# Patient Record
Sex: MALE | Race: WHITE | NOT HISPANIC OR LATINO | Employment: OTHER | URBAN - NONMETROPOLITAN AREA
[De-identification: names, ages, dates, MRNs, and addresses within clinical notes are randomized per-mention and may not be internally consistent; named-entity substitution may affect disease eponyms.]

---

## 2018-05-09 ENCOUNTER — OFFICE VISIT (OUTPATIENT)
Dept: INTERNAL MEDICINE | Facility: CLINIC | Age: 53
End: 2018-05-09

## 2018-05-09 VITALS
DIASTOLIC BLOOD PRESSURE: 82 MMHG | HEART RATE: 52 BPM | OXYGEN SATURATION: 96 % | TEMPERATURE: 97.8 F | SYSTOLIC BLOOD PRESSURE: 142 MMHG

## 2018-05-09 DIAGNOSIS — K40.90 LEFT INGUINAL HERNIA: Primary | ICD-10-CM

## 2018-05-09 PROCEDURE — 99213 OFFICE O/P EST LOW 20 MIN: CPT | Performed by: INTERNAL MEDICINE

## 2018-05-09 NOTE — PROGRESS NOTES
Chief Complaint   Patient presents with   • Abstract     Pt states he has a hernia, lower left groin region, that is growing.      Subjective   Ray Simmons is a 52 y.o. male.     Pt here today with complaints of possible hernia.   He noted a mass in left groin about a yr ago.   It has been enlarging over the past 2-3 months.  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     Not much pain over the area.          The following portions of the patient's history were reviewed and updated as appropriate: allergies, current medications, past family history, past medical history, past social history, past surgical history and problem list.    Review of Systems   Constitutional: Negative for appetite change and unexpected weight change.   Gastrointestinal: Negative for constipation and diarrhea.        Bulging mass in left groin       Objective   /82   Pulse 52   Temp 97.8 °F (36.6 °C)   SpO2 96%   There is no height or weight on file to calculate BMI.  Physical Exam   Constitutional: He is oriented to person, place, and time. He appears well-developed and well-nourished.   HENT:   Head: Normocephalic and atraumatic.   Eyes: EOM are normal. Pupils are equal, round, and reactive to light.   Pulmonary/Chest: Effort normal.   Abdominal: Soft. Bowel sounds are normal. He exhibits mass.   Obvious mass in medial left groin consistent with inguinal hernia, easily reduced   Neurological: He is alert and oriented to person, place, and time.   Psychiatric: He has a normal mood and affect. His behavior is normal. Judgment and thought content normal.   Nursing note and vitals reviewed.      Assessment/Plan   Ray Simmons is here today and the following problems have been addressed:      Ray was seen today for abstract.    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  He is aware to go to ER if he has severe pain    RTC prn    Please note that portions of this note were completed with a voice recognition program.  Efforts were made to edit dictation, but occasionally words are mistranscribed.

## 2018-05-21 ENCOUNTER — OFFICE VISIT (OUTPATIENT)
Dept: SURGERY | Facility: CLINIC | Age: 53
End: 2018-05-21

## 2018-05-21 VITALS
HEIGHT: 72 IN | HEART RATE: 43 BPM | SYSTOLIC BLOOD PRESSURE: 118 MMHG | TEMPERATURE: 97.9 F | DIASTOLIC BLOOD PRESSURE: 74 MMHG | OXYGEN SATURATION: 99 % | WEIGHT: 207 LBS | BODY MASS INDEX: 28.04 KG/M2

## 2018-05-21 DIAGNOSIS — IMO0002 MASS: Primary | ICD-10-CM

## 2018-05-21 DIAGNOSIS — K40.30 NON-RECURRENT UNILATERAL INGUINAL HERNIA WITH OBSTRUCTION WITHOUT GANGRENE: ICD-10-CM

## 2018-05-21 PROCEDURE — 99244 OFF/OP CNSLTJ NEW/EST MOD 40: CPT | Performed by: SURGERY

## 2018-05-21 NOTE — PROGRESS NOTES
Patient: Ray Simmons    YOB: 1965    Date: 05/21/2018    Primary Care Provider: Marilyn K. Vermeesch, MD    Reason for Consultation: Hernia    Chief Complaint   Patient presents with   • Hernia       Subjective .     History of present illness:  I saw the patient in the office today as a consultation for evaluation and treatment of a bulge in his left groin. He states he has had a bulge intermittently for the past year, worsening with movement. He denies pain. He states he does not recall when the bulge first appeared. 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 He does have crampy type discomfort, worse with lifting, relieved with rest, nonradiating in nature, not associated with other symptoms, present for the past year.    The following portions of the patient's history were reviewed and updated as appropriate: allergies, current medications, past family history, past medical history, past social history, past surgical history and problem list.    Review of Systems   Constitutional: Negative for chills, fever and unexpected weight change.   HENT: Negative for trouble swallowing and voice change.    Eyes: Negative for visual disturbance.   Respiratory: Negative for apnea, cough, chest tightness, shortness of breath and wheezing.    Cardiovascular: Negative for chest pain, palpitations and leg swelling.   Gastrointestinal: Negative for abdominal distention, abdominal pain, anal bleeding, blood in stool, constipation, diarrhea, nausea, rectal pain and vomiting.   Endocrine: Negative for cold intolerance and heat intolerance.   Genitourinary: Negative for difficulty urinating, dysuria, flank pain, scrotal swelling and testicular pain.   Musculoskeletal: Negative for back pain, gait problem and joint swelling.   Skin: Negative for color change, rash and wound.        Mass   Neurological: Negative for dizziness, syncope, speech difficulty, weakness, numbness and headaches.  "  Hematological: Negative for adenopathy. Does not bruise/bleed easily.   Psychiatric/Behavioral: Negative for confusion. The patient is not nervous/anxious.        History:  Past Medical History:   Diagnosis Date   • Elevated cholesterol 2003    REPORTS WAS TOLD \"IT \" BUT HAS NEVER TAKEN MEDICATION FOR THIS   • History of fracture     HISTORY RIGHT FOOT (REQUIRED SURGICAL INTERVENTION), LEFT WRIST, MULTIPLE FINGERS   • Inguinal hernia     LEFT   • PONV (postoperative nausea and vomiting)     AFTER RIGHT FOOT SURGERY   • Positive TB test     REPORTS HIS DAD HAD TB AND THAT HE HAD A + SKIN TEST AFTER EXPOSURE. NO ACTIVE DISEASE BUT DID HAVE TO TAKE MEDICATION.       Past Surgical History:   Procedure Laterality Date   • ADENOIDECTOMY     • FOOT PERCUTANEOUS PINNING Right    • FRACTURE SURGERY Right     FOOT   • HERNIA REPAIR Right     REPORTS AT AGE 1 OR 1    • TONSILLECTOMY     • WISDOM TOOTH EXTRACTION         Family History   Problem Relation Age of Onset   • Tuberculosis Father    • Mental illness Sister    • Cancer Maternal Uncle    • Hypertension Maternal Grandmother    • Heart attack Maternal Grandfather    • Hypertension Paternal Grandmother    • Heart attack Paternal Grandfather        Social History   Substance Use Topics   • Smoking status: Former Smoker     Packs/day: 1.00     Years: 7.00     Types: Cigarettes     Quit date: 1988   • Smokeless tobacco: Current User     Types: Chew   • Alcohol use No       Allergies:  No Known Allergies    Medications:  No current facility-administered medications for this visit.   No current outpatient prescriptions on file.    Facility-Administered Medications Ordered in Other Visits:   •  bupivacaine (PF) (MARCAINE) 0.5 % injection  - ADS Override Pull, , , ,   •  bupivacaine-EPINEPHrine PF (MARCAINE w/EPI) 0.5% -[id number] injection  - ADS Override Pull, , , ,   •  ceFAZolin (ANCEF) IVPB (duplex) 2 g, 2 g, Intravenous, Once, Alejandro Underwood MD  •  lactated ringers " "infusion 1,000 mL, 1,000 mL, Intravenous, Continuous, Alejandro Underwood MD, Last Rate: 25 mL/hr at 05/23/18 0730, 1,000 mL at 05/23/18 0730  •  Scopolamine (TRANSDERM-SCOP) 1.5 MG/3DAYS patch 1 patch, 1 patch, Transdermal, Q72H, Ray Tam, CRNA, 1 patch at 05/23/18 0741  •  sodium chloride 0.9 % flush 3 mL, 3 mL, Intravenous, PRN, Alejanrdo Underwood MD    Objective     Vital Signs:   Vitals:    05/21/18 0830   BP: 118/74   Pulse: (!) 43   Temp: 97.9 °F (36.6 °C)   SpO2: 99%   Weight: 93.9 kg (207 lb)   Height: 182.9 cm (72.01\")       Physical Exam:   General Appearance:    Alert, cooperative, in no acute distress   Head:    Normocephalic, without obvious abnormality, atraumatic   Eyes:            Lids and lashes normal, conjunctivae and sclerae normal, no   icterus, no pallor, corneas clear, PERRLA   Ears:    Ears appear intact with no abnormalities noted   Throat:   No oral lesions, no thrush, oral mucosa moist   Neck:   No adenopathy, supple, trachea midline, no thyromegaly, no   carotid bruit, no JVD   Lungs:     Clear to auscultation,respirations regular, even and                  unlabored    Heart:    Regular rhythm and normal rate, normal S1 and S2, no            murmur   Abdomen:     no masses, no organomegaly, soft non-tender, non-distended, no guarding, there is evidence of a reducible left inguinal hernia   Extremities:   Moves all extremities well, no edema, no cyanosis, no             redness   Pulses:   Pulses palpable and equal bilaterally   Skin:   No bleeding, bruising or rash   Lymph nodes:   No palpable adenopathy   Neurologic:   Cranial nerves 2 - 12 grossly intact, sensation intact        Results Review:   I reviewed the patient's new clinical results.  I reviewed the patient's new imaging results and agree with the interpretation.  I reviewed the patient's other test results and agree with the interpretation    Review of Systems was reviewed and confirmed as accurate " today.    Assessment/Plan :    1. Mass    2. Non-recurrent unilateral inguinal hernia with obstruction without gangrene        I had a detailed and extensive discussion with the patient in the office and they understand that they need to undergo hernia repair with mesh.  Full risks and benefits of operative versus nonoperative intervention were discussed with the patient and these included things such as nonresolution of symptoms and possible worsening of symptoms without surgical intervention versus infection, bleeding, possible recurrent hernia, possible postoperative neuralgia from nerve damage or involvement with scar tissue, etc.  The patient understands, agrees, and had no questions for me at the end of the office visit.     I discussed the patients findings and my recommendations with patient.    Electronically signed by Alejandro Underwood MD  05/23/18    Scribed for Alejandro Underwood MD by Araceli Rowley. 5/23/2018  9:19 AM

## 2018-05-22 ENCOUNTER — APPOINTMENT (OUTPATIENT)
Dept: PREADMISSION TESTING | Facility: HOSPITAL | Age: 53
End: 2018-05-22

## 2018-05-22 ENCOUNTER — PREP FOR SURGERY (OUTPATIENT)
Dept: OTHER | Facility: HOSPITAL | Age: 53
End: 2018-05-22

## 2018-05-22 VITALS — HEIGHT: 72 IN | WEIGHT: 201 LBS | BODY MASS INDEX: 27.22 KG/M2

## 2018-05-22 DIAGNOSIS — K40.30 NON-RECURRENT UNILATERAL INGUINAL HERNIA WITH OBSTRUCTION WITHOUT GANGRENE: ICD-10-CM

## 2018-05-22 DIAGNOSIS — K40.30 NON-RECURRENT UNILATERAL INGUINAL HERNIA WITH OBSTRUCTION WITHOUT GANGRENE: Primary | ICD-10-CM

## 2018-05-22 LAB
DEPRECATED RDW RBC AUTO: 39.8 FL (ref 37–54)
ERYTHROCYTE [DISTWIDTH] IN BLOOD BY AUTOMATED COUNT: 12 % (ref 11.5–14.5)
HCT VFR BLD AUTO: 42.7 % (ref 42–52)
HGB BLD-MCNC: 14.9 G/DL (ref 14–18)
MCH RBC QN AUTO: 31.9 PG (ref 27–31)
MCHC RBC AUTO-ENTMCNC: 34.9 G/DL (ref 30–37)
MCV RBC AUTO: 91.4 FL (ref 80–94)
PLATELET # BLD AUTO: 255 10*3/MM3 (ref 130–400)
PMV BLD AUTO: 10.3 FL (ref 6–12)
RBC # BLD AUTO: 4.67 10*6/MM3 (ref 4.7–6.1)
WBC NRBC COR # BLD: 6.99 10*3/MM3 (ref 4.8–10.8)

## 2018-05-22 PROCEDURE — 85027 COMPLETE CBC AUTOMATED: CPT | Performed by: SURGERY

## 2018-05-22 PROCEDURE — 36415 COLL VENOUS BLD VENIPUNCTURE: CPT

## 2018-05-22 PROCEDURE — 87081 CULTURE SCREEN ONLY: CPT | Performed by: SURGERY

## 2018-05-23 ENCOUNTER — HOSPITAL ENCOUNTER (OUTPATIENT)
Facility: HOSPITAL | Age: 53
Setting detail: HOSPITAL OUTPATIENT SURGERY
Discharge: HOME | End: 2018-05-23
Attending: SURGERY | Admitting: SURGERY

## 2018-05-23 ENCOUNTER — ANESTHESIA EVENT (OUTPATIENT)
Dept: PERIOP | Facility: HOSPITAL | Age: 53
End: 2018-05-23

## 2018-05-23 ENCOUNTER — ANESTHESIA (OUTPATIENT)
Dept: PERIOP | Facility: HOSPITAL | Age: 53
End: 2018-05-23

## 2018-05-23 VITALS
SYSTOLIC BLOOD PRESSURE: 121 MMHG | HEART RATE: 50 BPM | OXYGEN SATURATION: 98 % | TEMPERATURE: 97.3 F | DIASTOLIC BLOOD PRESSURE: 75 MMHG | RESPIRATION RATE: 18 BRPM

## 2018-05-23 DIAGNOSIS — K40.30 NON-RECURRENT UNILATERAL INGUINAL HERNIA WITH OBSTRUCTION WITHOUT GANGRENE: ICD-10-CM

## 2018-05-23 PROCEDURE — 25010000002 ONDANSETRON PER 1 MG: Performed by: NURSE ANESTHETIST, CERTIFIED REGISTERED

## 2018-05-23 PROCEDURE — 25010000002 MIDAZOLAM PER 1 MG: Performed by: NURSE ANESTHETIST, CERTIFIED REGISTERED

## 2018-05-23 PROCEDURE — 25010000002 DEXAMETHASONE PER 1 MG: Performed by: NURSE ANESTHETIST, CERTIFIED REGISTERED

## 2018-05-23 PROCEDURE — 49505 PRP I/HERN INIT REDUC >5 YR: CPT | Performed by: SURGERY

## 2018-05-23 PROCEDURE — 25010000002 PROPOFOL 200 MG/20ML EMULSION: Performed by: NURSE ANESTHETIST, CERTIFIED REGISTERED

## 2018-05-23 PROCEDURE — C1781 MESH (IMPLANTABLE): HCPCS | Performed by: SURGERY

## 2018-05-23 PROCEDURE — 25010000002 KETOROLAC TROMETHAMINE PER 15 MG: Performed by: NURSE ANESTHETIST, CERTIFIED REGISTERED

## 2018-05-23 PROCEDURE — 25010000002 FENTANYL CITRATE (PF) 250 MCG/5ML SOLUTION: Performed by: NURSE ANESTHETIST, CERTIFIED REGISTERED

## 2018-05-23 PROCEDURE — 25010000003 CEFAZOLIN PER 500 MG: Performed by: SURGERY

## 2018-05-23 DEVICE — MESH PROLN 3X6: Type: IMPLANTABLE DEVICE | Site: ABDOMEN | Status: FUNCTIONAL

## 2018-05-23 RX ORDER — DEXAMETHASONE SODIUM PHOSPHATE 4 MG/ML
INJECTION, SOLUTION INTRA-ARTICULAR; INTRALESIONAL; INTRAMUSCULAR; INTRAVENOUS; SOFT TISSUE AS NEEDED
Status: DISCONTINUED | OUTPATIENT
Start: 2018-05-23 | End: 2018-05-23 | Stop reason: SURG

## 2018-05-23 RX ORDER — ROCURONIUM BROMIDE 10 MG/ML
INJECTION, SOLUTION INTRAVENOUS AS NEEDED
Status: DISCONTINUED | OUTPATIENT
Start: 2018-05-23 | End: 2018-05-23 | Stop reason: SURG

## 2018-05-23 RX ORDER — FENTANYL CITRATE 50 UG/ML
INJECTION, SOLUTION INTRAMUSCULAR; INTRAVENOUS AS NEEDED
Status: DISCONTINUED | OUTPATIENT
Start: 2018-05-23 | End: 2018-05-23 | Stop reason: SURG

## 2018-05-23 RX ORDER — PROPOFOL 10 MG/ML
INJECTION, EMULSION INTRAVENOUS AS NEEDED
Status: DISCONTINUED | OUTPATIENT
Start: 2018-05-23 | End: 2018-05-23 | Stop reason: SURG

## 2018-05-23 RX ORDER — ONDANSETRON 4 MG/1
4 TABLET, FILM COATED ORAL ONCE AS NEEDED
Status: DISCONTINUED | OUTPATIENT
Start: 2018-05-23 | End: 2018-05-23 | Stop reason: HOSPADM

## 2018-05-23 RX ORDER — HYDROCODONE BITARTRATE AND ACETAMINOPHEN 7.5; 325 MG/1; MG/1
1 TABLET ORAL ONCE AS NEEDED
Status: DISCONTINUED | OUTPATIENT
Start: 2018-05-23 | End: 2018-05-23 | Stop reason: HOSPADM

## 2018-05-23 RX ORDER — HYDROCODONE BITARTRATE AND ACETAMINOPHEN 7.5; 325 MG/1; MG/1
1-2 TABLET ORAL EVERY 4 HOURS PRN
Qty: 20 TABLET | Refills: 0 | Status: SHIPPED | OUTPATIENT
Start: 2018-05-23 | End: 2021-04-29

## 2018-05-23 RX ORDER — ACETAMINOPHEN 500 MG
1000 TABLET ORAL ONCE
Status: COMPLETED | OUTPATIENT
Start: 2018-05-23 | End: 2018-05-23

## 2018-05-23 RX ORDER — CELECOXIB 100 MG/1
200 CAPSULE ORAL ONCE
Status: COMPLETED | OUTPATIENT
Start: 2018-05-23 | End: 2018-05-23

## 2018-05-23 RX ORDER — SODIUM CHLORIDE, SODIUM LACTATE, POTASSIUM CHLORIDE, CALCIUM CHLORIDE 600; 310; 30; 20 MG/100ML; MG/100ML; MG/100ML; MG/100ML
1000 INJECTION, SOLUTION INTRAVENOUS CONTINUOUS
Status: DISCONTINUED | OUTPATIENT
Start: 2018-05-23 | End: 2018-05-23 | Stop reason: HOSPADM

## 2018-05-23 RX ORDER — SCOLOPAMINE TRANSDERMAL SYSTEM 1 MG/1
1 PATCH, EXTENDED RELEASE TRANSDERMAL
Status: DISCONTINUED | OUTPATIENT
Start: 2018-05-23 | End: 2018-05-23 | Stop reason: HOSPADM

## 2018-05-23 RX ORDER — BUPIVACAINE HYDROCHLORIDE 5 MG/ML
INJECTION, SOLUTION EPIDURAL; INTRACAUDAL
Status: DISCONTINUED
Start: 2018-05-23 | End: 2018-05-23 | Stop reason: HOSPADM

## 2018-05-23 RX ORDER — ONDANSETRON 2 MG/ML
INJECTION INTRAMUSCULAR; INTRAVENOUS AS NEEDED
Status: DISCONTINUED | OUTPATIENT
Start: 2018-05-23 | End: 2018-05-23 | Stop reason: SURG

## 2018-05-23 RX ORDER — BUPIVACAINE HYDROCHLORIDE AND EPINEPHRINE 5; 5 MG/ML; UG/ML
INJECTION, SOLUTION PERINEURAL AS NEEDED
Status: DISCONTINUED | OUTPATIENT
Start: 2018-05-23 | End: 2018-05-23 | Stop reason: HOSPADM

## 2018-05-23 RX ORDER — GLYCOPYRROLATE 0.2 MG/ML
INJECTION INTRAMUSCULAR; INTRAVENOUS AS NEEDED
Status: DISCONTINUED | OUTPATIENT
Start: 2018-05-23 | End: 2018-05-23 | Stop reason: SURG

## 2018-05-23 RX ORDER — PROMETHAZINE HYDROCHLORIDE 25 MG/ML
12.5 INJECTION, SOLUTION INTRAMUSCULAR; INTRAVENOUS ONCE AS NEEDED
Status: DISCONTINUED | OUTPATIENT
Start: 2018-05-23 | End: 2018-05-23 | Stop reason: HOSPADM

## 2018-05-23 RX ORDER — SODIUM CHLORIDE 0.9 % (FLUSH) 0.9 %
3 SYRINGE (ML) INJECTION AS NEEDED
Status: DISCONTINUED | OUTPATIENT
Start: 2018-05-23 | End: 2018-05-23 | Stop reason: HOSPADM

## 2018-05-23 RX ORDER — MIDAZOLAM HYDROCHLORIDE 1 MG/ML
INJECTION INTRAMUSCULAR; INTRAVENOUS AS NEEDED
Status: DISCONTINUED | OUTPATIENT
Start: 2018-05-23 | End: 2018-05-23 | Stop reason: SURG

## 2018-05-23 RX ORDER — KETOROLAC TROMETHAMINE 30 MG/ML
INJECTION, SOLUTION INTRAMUSCULAR; INTRAVENOUS AS NEEDED
Status: DISCONTINUED | OUTPATIENT
Start: 2018-05-23 | End: 2018-05-23 | Stop reason: SURG

## 2018-05-23 RX ORDER — BUPIVACAINE HYDROCHLORIDE AND EPINEPHRINE 5; 5 MG/ML; UG/ML
INJECTION, SOLUTION EPIDURAL; INTRACAUDAL; PERINEURAL
Status: DISCONTINUED
Start: 2018-05-23 | End: 2018-05-23 | Stop reason: HOSPADM

## 2018-05-23 RX ORDER — IBUPROFEN 600 MG/1
600 TABLET ORAL EVERY 6 HOURS PRN
Status: DISCONTINUED | OUTPATIENT
Start: 2018-05-23 | End: 2018-05-23 | Stop reason: HOSPADM

## 2018-05-23 RX ORDER — ONDANSETRON 2 MG/ML
4 INJECTION INTRAMUSCULAR; INTRAVENOUS ONCE AS NEEDED
Status: DISCONTINUED | OUTPATIENT
Start: 2018-05-23 | End: 2018-05-23 | Stop reason: HOSPADM

## 2018-05-23 RX ADMIN — FENTANYL CITRATE 50 MCG: 50 INJECTION, SOLUTION INTRAMUSCULAR; INTRAVENOUS at 09:43

## 2018-05-23 RX ADMIN — FENTANYL CITRATE 100 MCG: 50 INJECTION, SOLUTION INTRAMUSCULAR; INTRAVENOUS at 09:46

## 2018-05-23 RX ADMIN — ROCURONIUM BROMIDE 5 MG: 10 INJECTION INTRAVENOUS at 09:48

## 2018-05-23 RX ADMIN — FENTANYL CITRATE 100 MCG: 50 INJECTION, SOLUTION INTRAMUSCULAR; INTRAVENOUS at 09:28

## 2018-05-23 RX ADMIN — SCOPALAMINE 1 PATCH: 1 PATCH, EXTENDED RELEASE TRANSDERMAL at 07:41

## 2018-05-23 RX ADMIN — ACETAMINOPHEN 1000 MG: 500 TABLET, FILM COATED ORAL at 07:40

## 2018-05-23 RX ADMIN — PROPOFOL 150 MG: 10 INJECTION, EMULSION INTRAVENOUS at 09:31

## 2018-05-23 RX ADMIN — MIDAZOLAM HYDROCHLORIDE 1 MG: 1 INJECTION, SOLUTION INTRAMUSCULAR; INTRAVENOUS at 09:28

## 2018-05-23 RX ADMIN — CEFAZOLIN SODIUM 2 G: 2 SOLUTION INTRAVENOUS at 09:25

## 2018-05-23 RX ADMIN — DEXAMETHASONE SODIUM PHOSPHATE 8 MG: 4 INJECTION, SOLUTION INTRAMUSCULAR; INTRAVENOUS at 09:34

## 2018-05-23 RX ADMIN — FENTANYL CITRATE 50 MCG: 50 INJECTION, SOLUTION INTRAMUSCULAR; INTRAVENOUS at 10:22

## 2018-05-23 RX ADMIN — ONDANSETRON 4 MG: 2 INJECTION INTRAMUSCULAR; INTRAVENOUS at 09:34

## 2018-05-23 RX ADMIN — LIDOCAINE HYDROCHLORIDE 80 MG: 20 INJECTION, SOLUTION INTRAVENOUS at 09:31

## 2018-05-23 RX ADMIN — FENTANYL CITRATE 50 MCG: 50 INJECTION, SOLUTION INTRAMUSCULAR; INTRAVENOUS at 10:17

## 2018-05-23 RX ADMIN — SODIUM CHLORIDE, POTASSIUM CHLORIDE, SODIUM LACTATE AND CALCIUM CHLORIDE 1000 ML: 600; 310; 30; 20 INJECTION, SOLUTION INTRAVENOUS at 07:30

## 2018-05-23 RX ADMIN — KETOROLAC TROMETHAMINE 30 MG: 30 INJECTION, SOLUTION INTRAMUSCULAR at 10:15

## 2018-05-23 RX ADMIN — GLYCOPYRROLATE 0.2 MG: 0.2 INJECTION, SOLUTION INTRAMUSCULAR; INTRAVENOUS at 09:28

## 2018-05-23 RX ADMIN — CELECOXIB 200 MG: 100 CAPSULE ORAL at 07:40

## 2018-05-23 RX ADMIN — FENTANYL CITRATE 50 MCG: 50 INJECTION, SOLUTION INTRAMUSCULAR; INTRAVENOUS at 10:11

## 2018-05-23 NOTE — OP NOTE
PATIENT:    Ray Simmons    DATE OF SURGERY:   5/23/2018    PHYSICIAN:    Alejandro Underwood MD    REFERRING PHYSICIAN:  Marilyn K. Vermeesch, MD    YOB: 1965    PREOPERATIVE DIAGNOSIS:  Left inguinal hernia    POSTOPERATIVE DIAGNOSIS: Left inguinal hernia containing sigmoid colon    PROCEDURE: Left inguinal herniorraphy via Timur repair    HISTORY:  The patient presents to me for evaluation and treatment of a history significant for a left inguinal hernia.    ANESTHESIA:  General endotracheal.    OPERATIVE PROCEDURE:  The patient was taken to the operating room, placed in the supine position, and given general endotracheal anesthesia per the Anesthesia service.  The patient was prepped and draped in the normal sterile fashion and the patient was given preoperative IV antibiotics.  An appropriate timeout per the nursing staff was performed prior to the incision.  I did meet with the patient preoperatively and marked them accordingly.    A transverse incision was made over the left inguinal canal.  This was sharply dissected down through the subcutaneous fat to the external oblique fibers, which were then divided in their direction.  Blunt dissection was used to dissect the surrounding cord structures from the pubic tubercle.  There was evidence of an direct sac, there was a lipoma of the cord that was sent for specimen. The sigmoid colon was reduced without difficulty via the direct opening.    A 3 x 6 piece of Marlex mesh was then fashioned and placed in the floor of the inguinal canal.  It was sutured in place medially with 0-Prolene suture to the pubic tubercle.  This was continued inferiorly and laterally to the shelving edge of the inguinal ligament and superiorly and laterally to the transversalis fascia.  The ends of the Marlex mesh were divided in order to create a new internal inguinal ring.  This was snugly reapproximated with an 0-Prolene suture.    I felt that I had adequate  repair at this point in time.  Marcaine was then injected in the fascia for postoperative anesthetic.      0-Vicryl suture was used in a running fashion to close the external oblique fibers.  3-0 Vicryl suture was used in a running fashion to close the wound.  A 4-0 Vicryl subcuticular stitch and Steri-Strips were used for skin reapproximation.      The patient was stable at this point in time and subsequently transferred back to the recovery room in stable condition.    Alejandro Underwood MD

## 2018-05-23 NOTE — ANESTHESIA POSTPROCEDURE EVALUATION
Patient: Ray Simmons    Procedure Summary     Date:  05/23/18 Room / Location:  [facility] OR  / [facility] OR    Anesthesia Start:  0925 Anesthesia Stop:      Procedure:  INGUINAL HERNIA REPAIR LEFT WITH MESH (Left Abdomen) Diagnosis:       Non-recurrent unilateral inguinal hernia with obstruction without gangrene      (Non-recurrent unilateral inguinal hernia with obstruction without gangrene [K40.30])    Surgeon:  Alejandro Underwood MD Provider:  Jenaro Ya CRNA    Anesthesia Type:  general ASA Status:  2          Anesthesia Type: general  Last vitals  BP   119/74   Temp   97.4   Pulse   89   Resp   15   SpO2   96     Post Anesthesia Care and Evaluation    Patient location during evaluation: PACU  Patient participation: complete - patient participated  Level of consciousness: awake and alert  Pain score: 3  Pain management: satisfactory to patient  Airway patency: patent  Anesthetic complications: No anesthetic complications  PONV Status: none  Cardiovascular status: acceptable and stable  Respiratory status: acceptable and face mask  Hydration status: acceptable

## 2018-05-23 NOTE — H&P (VIEW-ONLY)
Patient: Ray Simmons    YOB: 1965    Date: 05/21/2018    Primary Care Provider: Marilyn K. Vermeesch, MD    Reason for Consultation: Hernia    Chief Complaint   Patient presents with   • Hernia       Subjective .     History of present illness:  I saw the patient in the office today as a consultation for evaluation and treatment of a bulge in his left groin. He states he has had a bulge intermittently for the past year, worsening with movement. He denies pain. He states he does not recall when the bulge first appeared. 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 He does have crampy type discomfort, worse with lifting, relieved with rest, nonradiating in nature, not associated with other symptoms, present for the past year.    The following portions of the patient's history were reviewed and updated as appropriate: allergies, current medications, past family history, past medical history, past social history, past surgical history and problem list.    Review of Systems   Constitutional: Negative for chills, fever and unexpected weight change.   HENT: Negative for trouble swallowing and voice change.    Eyes: Negative for visual disturbance.   Respiratory: Negative for apnea, cough, chest tightness, shortness of breath and wheezing.    Cardiovascular: Negative for chest pain, palpitations and leg swelling.   Gastrointestinal: Negative for abdominal distention, abdominal pain, anal bleeding, blood in stool, constipation, diarrhea, nausea, rectal pain and vomiting.   Endocrine: Negative for cold intolerance and heat intolerance.   Genitourinary: Negative for difficulty urinating, dysuria, flank pain, scrotal swelling and testicular pain.   Musculoskeletal: Negative for back pain, gait problem and joint swelling.   Skin: Negative for color change, rash and wound.        Mass   Neurological: Negative for dizziness, syncope, speech difficulty, weakness, numbness and headaches.  "  Hematological: Negative for adenopathy. Does not bruise/bleed easily.   Psychiatric/Behavioral: Negative for confusion. The patient is not nervous/anxious.        History:  Past Medical History:   Diagnosis Date   • Elevated cholesterol 2003    REPORTS WAS TOLD \"IT \" BUT HAS NEVER TAKEN MEDICATION FOR THIS   • History of fracture     HISTORY RIGHT FOOT (REQUIRED SURGICAL INTERVENTION), LEFT WRIST, MULTIPLE FINGERS   • Inguinal hernia     LEFT   • PONV (postoperative nausea and vomiting)     AFTER RIGHT FOOT SURGERY   • Positive TB test     REPORTS HIS DAD HAD TB AND THAT HE HAD A + SKIN TEST AFTER EXPOSURE. NO ACTIVE DISEASE BUT DID HAVE TO TAKE MEDICATION.       Past Surgical History:   Procedure Laterality Date   • ADENOIDECTOMY     • FOOT PERCUTANEOUS PINNING Right    • FRACTURE SURGERY Right     FOOT   • HERNIA REPAIR Right     REPORTS AT AGE 1 OR 1    • TONSILLECTOMY     • WISDOM TOOTH EXTRACTION         Family History   Problem Relation Age of Onset   • Tuberculosis Father    • Mental illness Sister    • Cancer Maternal Uncle    • Hypertension Maternal Grandmother    • Heart attack Maternal Grandfather    • Hypertension Paternal Grandmother    • Heart attack Paternal Grandfather        Social History   Substance Use Topics   • Smoking status: Former Smoker     Packs/day: 1.00     Years: 7.00     Types: Cigarettes     Quit date: 1988   • Smokeless tobacco: Current User     Types: Chew   • Alcohol use No       Allergies:  No Known Allergies    Medications:  No current facility-administered medications for this visit.   No current outpatient prescriptions on file.    Facility-Administered Medications Ordered in Other Visits:   •  bupivacaine (PF) (MARCAINE) 0.5 % injection  - ADS Override Pull, , , ,   •  bupivacaine-EPINEPHrine PF (MARCAINE w/EPI) 0.5% -1:[id number] injection  - ADS Override Pull, , , ,   •  ceFAZolin (ANCEF) IVPB (duplex) 2 g, 2 g, Intravenous, Once, Alejandro Underwood MD  •  lactated ringers " "infusion 1,000 mL, 1,000 mL, Intravenous, Continuous, Alejandro Underwood MD, Last Rate: 25 mL/hr at 05/23/18 0730, 1,000 mL at 05/23/18 0730  •  Scopolamine (TRANSDERM-SCOP) 1.5 MG/3DAYS patch 1 patch, 1 patch, Transdermal, Q72H, Ray Tam, CRNA, 1 patch at 05/23/18 0741  •  sodium chloride 0.9 % flush 3 mL, 3 mL, Intravenous, PRN, Alejandro Underwood MD    Objective     Vital Signs:   Vitals:    05/21/18 0830   BP: 118/74   Pulse: (!) 43   Temp: 97.9 °F (36.6 °C)   SpO2: 99%   Weight: 93.9 kg (207 lb)   Height: 182.9 cm (72.01\")       Physical Exam:   General Appearance:    Alert, cooperative, in no acute distress   Head:    Normocephalic, without obvious abnormality, atraumatic   Eyes:            Lids and lashes normal, conjunctivae and sclerae normal, no   icterus, no pallor, corneas clear, PERRLA   Ears:    Ears appear intact with no abnormalities noted   Throat:   No oral lesions, no thrush, oral mucosa moist   Neck:   No adenopathy, supple, trachea midline, no thyromegaly, no   carotid bruit, no JVD   Lungs:     Clear to auscultation,respirations regular, even and                  unlabored    Heart:    Regular rhythm and normal rate, normal S1 and S2, no            murmur   Abdomen:     no masses, no organomegaly, soft non-tender, non-distended, no guarding, there is evidence of a reducible left inguinal hernia   Extremities:   Moves all extremities well, no edema, no cyanosis, no             redness   Pulses:   Pulses palpable and equal bilaterally   Skin:   No bleeding, bruising or rash   Lymph nodes:   No palpable adenopathy   Neurologic:   Cranial nerves 2 - 12 grossly intact, sensation intact        Results Review:   I reviewed the patient's new clinical results.  I reviewed the patient's new imaging results and agree with the interpretation.  I reviewed the patient's other test results and agree with the interpretation    Review of Systems was reviewed and confirmed as accurate " today.    Assessment/Plan :    1. Mass    2. Non-recurrent unilateral inguinal hernia with obstruction without gangrene        I had a detailed and extensive discussion with the patient in the office and they understand that they need to undergo hernia repair with mesh.  Full risks and benefits of operative versus nonoperative intervention were discussed with the patient and these included things such as nonresolution of symptoms and possible worsening of symptoms without surgical intervention versus infection, bleeding, possible recurrent hernia, possible postoperative neuralgia from nerve damage or involvement with scar tissue, etc.  The patient understands, agrees, and had no questions for me at the end of the office visit.     I discussed the patients findings and my recommendations with patient.    Electronically signed by Alejandro Underwood MD  05/23/18    Scribed for Alejandro Underwood MD by Araceli Rowley. 5/23/2018  9:19 AM

## 2018-05-23 NOTE — ANESTHESIA PREPROCEDURE EVALUATION
Anesthesia Evaluation     Patient summary reviewed and Nursing notes reviewed   history of anesthetic complications: PONV  NPO Solid Status: > 8 hours  NPO Liquid Status: > 8 hours           Airway   Mallampati: I  TM distance: >3 FB  Neck ROM: full  Narrow palate  Dental - normal exam   (+) poor dentition    Pulmonary - negative pulmonary ROS and normal exam   Cardiovascular - normal exam    (+) hyperlipidemia,       Neuro/Psych- negative ROS  GI/Hepatic/Renal/Endo - negative ROS     Musculoskeletal (-) negative ROS    Abdominal    Substance History - negative use     OB/GYN negative ob/gyn ROS         Other - negative ROS                       Anesthesia Plan    ASA 2     general     intravenous induction   Anesthetic plan and risks discussed with patient.

## 2018-05-23 NOTE — ANESTHESIA PROCEDURE NOTES
Airway  Urgency: elective    Airway not difficult    General Information and Staff    Patient location during procedure: OR  CRNA: JAMARCUS GUEVARA    Indications and Patient Condition  Indications for airway management: airway protection    Preoxygenated: yes  Mask difficulty assessment: 0 - not attempted    Final Airway Details  Final airway type: supraglottic airway      Successful airway: classic  Size 4    Number of attempts at approach: 1    Additional Comments  Cuff pressure/leak less than [id number]

## 2018-05-25 LAB — MRSA SPEC QL CULT: NORMAL

## 2018-05-26 LAB
LAB AP CASE REPORT: NORMAL
Lab: NORMAL
PATH REPORT.FINAL DX SPEC: NORMAL

## 2018-06-13 ENCOUNTER — OFFICE VISIT (OUTPATIENT)
Dept: SURGERY | Facility: CLINIC | Age: 53
End: 2018-06-13

## 2018-06-13 VITALS
BODY MASS INDEX: 27.23 KG/M2 | OXYGEN SATURATION: 100 % | HEART RATE: 56 BPM | WEIGHT: 201.06 LBS | TEMPERATURE: 98.2 F | HEIGHT: 72 IN

## 2018-06-13 DIAGNOSIS — Z98.890 POST-OPERATIVE STATE: Primary | ICD-10-CM

## 2018-06-13 PROCEDURE — 99024 POSTOP FOLLOW-UP VISIT: CPT | Performed by: SURGERY

## 2018-06-13 NOTE — PROGRESS NOTES
"Patient: Ray Simmons    YOB: 1965    Date: 06/13/2018    Primary Care Provider: Marilyn K. Vermeesch, MD    Reason for Consultation: Follow-up hernia    Chief Complaint   Patient presents with   • Post-op Hernia       History of present illness:  I saw the patient in the office today as a followup from their recent hernia repair.  The pathology showed a lipoma.  They state that they have done well and are having no complaints.    The following portions of the patient's history were reviewed and updated as appropriate: allergies, current medications, past family history, past medical history, past social history, past surgical history and problem list.    Vital Signs:   Vitals:    06/13/18 1245   Pulse: 56   Temp: 98.2 °F (36.8 °C)   TempSrc: Temporal Artery    SpO2: 100%   Weight: 91.2 kg (201 lb 1 oz)   Height: 182.9 cm (72.01\")       Physical Exam:   General Appearance:    Alert, cooperative, in no acute distress   Abdomen:     no masses, no organomegaly, soft non-tender, non-distended, no guarding, wounds are well healed, no evidence of recurrent hernia     Results Review:   I reviewed the patient's new clinical results.  I reviewed the patient's new imaging results and agree with the interpretation.  I reviewed the patient's other test results and agree with the interpretation    Assessment / Plan:    1. Post-operative state        I did discuss the situation with the patient today in the office and they have done well from their recent herniorraphy.  I have released the patient back to normal activity, they understand that they need to be careful about heavy lifting.  I need to see the patient back in the office only if they are having further problems, they know to call me if they are.    Electronically signed by Alejandro Underwood MD  06/26/18      Portions of this note have been scribed for Alejandro Underwood MD by Michelle Milligan. 6/26/2018  4:01 PM              "

## 2021-04-29 ENCOUNTER — OFFICE VISIT (OUTPATIENT)
Dept: INTERNAL MEDICINE | Facility: CLINIC | Age: 56
End: 2021-04-29

## 2021-04-29 VITALS
DIASTOLIC BLOOD PRESSURE: 68 MMHG | OXYGEN SATURATION: 98 % | HEIGHT: 69 IN | SYSTOLIC BLOOD PRESSURE: 132 MMHG | WEIGHT: 198 LBS | BODY MASS INDEX: 29.33 KG/M2 | TEMPERATURE: 97 F | HEART RATE: 52 BPM

## 2021-04-29 DIAGNOSIS — K40.90 LEFT INGUINAL HERNIA: Primary | ICD-10-CM

## 2021-04-29 PROBLEM — K40.30 NON-RECURRENT UNILATERAL INGUINAL HERNIA WITH OBSTRUCTION WITHOUT GANGRENE: Status: RESOLVED | Noted: 2018-05-22 | Resolved: 2021-04-29

## 2021-04-29 PROCEDURE — 99213 OFFICE O/P EST LOW 20 MIN: CPT | Performed by: INTERNAL MEDICINE

## 2021-04-29 NOTE — PROGRESS NOTES
"Chief Complaint   Patient presents with   • Abstract     Pt states his had pressure in abdomen and some swelling.      Subjective   Ray Simmons is a 55 y.o. male.     Here today with complaints of abdominal pain.   He is having LLQ pain for about 3 weeks.    No fevers or chills, he does not feel sick.   No pain with lifting, but there is increased pressure and bulge in area with lifting.   No changes in bowel habits and is urinating regularly.   No changes in appetite.   The pain is worse if he tries to dig, shoveling, etc.           The following portions of the patient's history were reviewed and updated as appropriate: allergies, current medications, past family history, past medical history, past social history, past surgical history and problem list.    Review of Systems   Constitutional: Negative for appetite change.   Gastrointestinal: Positive for abdominal pain. Negative for constipation, diarrhea, nausea and vomiting.        Bulge and increased pressure in left inguinal area       Objective   /68   Pulse 52   Temp 97 °F (36.1 °C)   Ht 175.3 cm (69\")   Wt 89.8 kg (198 lb)   SpO2 98%   BMI 29.24 kg/m²   Body mass index is 29.24 kg/m².  Physical Exam  Vitals and nursing note reviewed.   Constitutional:       Appearance: Normal appearance. He is not toxic-appearing or diaphoretic.   HENT:      Head: Normocephalic and atraumatic.      Right Ear: External ear normal.      Left Ear: External ear normal.   Pulmonary:      Effort: Pulmonary effort is normal. No respiratory distress.   Abdominal:      General: Bowel sounds are normal. There is no distension.      Palpations: Abdomen is soft. There is no mass.      Tenderness: There is no abdominal tenderness.      Hernia: A hernia is present.      Comments: Left lower quadrant with no significant pain to palpation, upon standing there is a bulge noted in inguinal area that increases with intra-abdominal pressure   Neurological:      General: No " focal deficit present.      Mental Status: He is alert and oriented to person, place, and time.   Psychiatric:         Mood and Affect: Mood normal.         Behavior: Behavior normal.         Thought Content: Thought content normal.         Judgment: Judgment normal.         Assessment/Plan   Ray Simmons is here today and the following problems have been addressed:      Diagnoses and all orders for this visit:    1. Left inguinal hernia (Primary)  -     Ambulatory Referral to General Surgery    Refer to Dr. Underwood for evaluation for possible recurrence of left inguinal hernia  Encouraged patient to come to clinic for routine physical exam and labs, however, he declined  Discussed Covid vaccine, however patient declines to have this done    Return to clinic as needed    Please note that portions of this note were completed with a voice recognition program.  Efforts were made to edit dictation, but occasionally words are mistranscribed.

## 2021-05-10 NOTE — PROGRESS NOTES
Patient: Ray Simmons    YOB: 1965    Date: 05/11/2021    Primary Care Provider: Vermeesch, Marilyn K, MD    Chief Complaint   Patient presents with   • Hernia     eval       SUBJECTIVE:    History of present illness:  I saw the patient in the office today as a consultation for evaluation and treatment of a left inguinal hernia. Patient states has been there for 4-5 weeks. He states has pain at time.    Apparently the patient did have a previous left inguinal hernia repair in 2018 via a [place] approach.  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    The following portions of the patient's history were reviewed and updated as appropriate: allergies, current medications, past family history, past medical history, past social history, past surgical history and problem list.    Review of Systems   Constitutional: Negative for chills, fever and unexpected weight change.   HENT: Negative for trouble swallowing and voice change.    Eyes: Negative for visual disturbance.   Respiratory: Negative for apnea, cough, chest tightness, shortness of breath and wheezing.    Cardiovascular: Negative for chest pain, palpitations and leg swelling.   Gastrointestinal: Negative for abdominal distention, abdominal pain, anal bleeding, blood in stool, constipation, diarrhea, nausea, rectal pain and vomiting.   Endocrine: Negative for cold intolerance and heat intolerance.   Genitourinary: Negative for difficulty urinating, dysuria, flank pain, scrotal swelling and testicular pain.   Musculoskeletal: Negative for back pain, gait problem and joint swelling.   Skin: Negative for color change, rash and wound.   Neurological: Negative for dizziness, syncope, speech  "difficulty, weakness, numbness and headaches.   Hematological: Negative for adenopathy. Does not bruise/bleed easily.   Psychiatric/Behavioral: Negative for confusion. The patient is not nervous/anxious.        History:  Past Medical History:   Diagnosis Date   • Elevated cholesterol     REPORTS WAS TOLD \"IT \" BUT HAS NEVER TAKEN MEDICATION FOR THIS   • History of fracture     HISTORY RIGHT FOOT (REQUIRED SURGICAL INTERVENTION), LEFT WRIST, MULTIPLE FINGERS   • Inguinal hernia     LEFT   • PONV (postoperative nausea and vomiting)     AFTER RIGHT FOOT SURGERY   • Positive TB test     REPORTS HIS DAD HAD TB AND THAT HE HAD A + SKIN TEST AFTER EXPOSURE. NO ACTIVE DISEASE BUT DID HAVE TO TAKE MEDICATION.       Past Surgical History:   Procedure Laterality Date   • ADENOIDECTOMY     • FOOT PERCUTANEOUS PINNING Right    • FRACTURE SURGERY Right     FOOT   • HERNIA REPAIR Right     REPORTS AT AGE 1 OR 1    • INGUINAL HERNIA REPAIR Left 2018    Procedure: INGUINAL HERNIA REPAIR LEFT WITH MESH;  Surgeon: Alejandro Underwood MD;  Location: [facility];  Service: General   • TONSILLECTOMY     • WISDOM TOOTH EXTRACTION         Family History   Problem Relation Age of Onset   • Tuberculosis Father    • Mental illness Sister    • Cancer Maternal Uncle    • Hypertension Maternal Grandmother    • Heart attack Maternal Grandfather    • Hypertension Paternal Grandmother    • Heart attack Paternal Grandfather        Social History     Tobacco Use   • Smoking status: Former Smoker     Packs/day: 1.00     Years: 7.00     Pack years: 7.00     Types: Cigarettes     Quit date:      Years since quittin.3   • Smokeless tobacco: Current User     Types: Chew   Substance Use Topics   • Alcohol use: No   • Drug use: No       Allergies:  No Known Allergies    Medications:  No current outpatient medications on file.    OBJECTIVE:    Vital Signs:   Vitals:    21 1513   BP: 130/80   Pulse: (!) 48   Temp: 97.3 °F (36.3 °C) " "  SpO2: 99%   Weight: 97.1 kg (214 lb)   Height: 175.3 cm (69\")       Physical Exam:   General Appearance:    Alert, cooperative, in no acute distress   Head:    Normocephalic, without obvious abnormality, atraumatic   Eyes:            Lids and lashes normal, conjunctivae and sclerae normal, no   icterus, no pallor, corneas clear, PERRLA   Ears:    Ears appear intact with no abnormalities noted   Throat:   No oral lesions, no thrush, oral mucosa moist   Neck:   No adenopathy, supple, trachea midline, no thyromegaly, no   carotid bruit, no JVD   Lungs:     Clear to auscultation,respirations regular, even and                  unlabored    Heart:    Regular rhythm and normal rate, normal S1 and S2, no            murmur   Abdomen:     no masses, no organomegaly, soft non-tender, non-distended, no guarding, there is evidence of a partially reducible left inguinal hernia recurrent in nature   Extremities:   Moves all extremities well, no edema, no cyanosis, no             redness   Pulses:   Pulses palpable and equal bilaterally   Skin:   No bleeding, bruising or rash   Lymph nodes:   No palpable adenopathy   Neurologic:   Cranial nerves 2 - 12 grossly intact, sensation intact        Results Review:   I reviewed the patient's new clinical results.  I reviewed the patient's new imaging results and agree with the interpretation.  I reviewed the patient's other test results and agree with the interpretation    Review of Systems was reviewed and confirmed as accurate as documented by the MA.    ASSESSMENT/PLAN:    1. Recurrent unilateral inguinal hernia with obstruction and without gangrene        I had a detailed and extensive discussion with the patient in the office and they understand that they need to undergo hernia repair with mesh.  Full risks and benefits of operative versus nonoperative intervention were discussed with the patient and these included things such as nonresolution of symptoms and possible worsening of " symptoms without surgical intervention versus infection, bleeding, possible recurrent hernia, possible postoperative neuralgia from nerve damage or involvement with scar tissue, etc.  The patient understands, agrees, and had no questions for me at the end of the office visit.     I discussed the patients findings and my recommendations with patient.    First the patient needs to have a left groin ultrasound to delineate what is in the hernia and the degree of herniation along with the fascial defect.    Electronically signed by Alejandro Underwood MD  05/11/21

## 2021-05-11 ENCOUNTER — OFFICE VISIT (OUTPATIENT)
Dept: SURGERY | Facility: CLINIC | Age: 56
End: 2021-05-11

## 2021-05-11 VITALS
OXYGEN SATURATION: 99 % | BODY MASS INDEX: 31.7 KG/M2 | TEMPERATURE: 97.3 F | HEIGHT: 69 IN | SYSTOLIC BLOOD PRESSURE: 130 MMHG | DIASTOLIC BLOOD PRESSURE: 80 MMHG | WEIGHT: 214 LBS | HEART RATE: 48 BPM

## 2021-05-11 DIAGNOSIS — K40.31 RECURRENT UNILATERAL INGUINAL HERNIA WITH OBSTRUCTION AND WITHOUT GANGRENE: Primary | ICD-10-CM

## 2021-05-11 PROCEDURE — 99243 OFF/OP CNSLTJ NEW/EST LOW 30: CPT | Performed by: SURGERY

## 2021-05-12 ENCOUNTER — OFFICE VISIT (OUTPATIENT)
Dept: SURGERY | Facility: CLINIC | Age: 56
End: 2021-05-12

## 2021-05-12 VITALS
HEIGHT: 69 IN | HEART RATE: 89 BPM | OXYGEN SATURATION: 99 % | TEMPERATURE: 98.6 F | BODY MASS INDEX: 31.7 KG/M2 | WEIGHT: 214 LBS

## 2021-05-12 DIAGNOSIS — K40.31 RECURRENT UNILATERAL INGUINAL HERNIA WITH OBSTRUCTION AND WITHOUT GANGRENE: Primary | ICD-10-CM

## 2021-05-12 PROCEDURE — 99213 OFFICE O/P EST LOW 20 MIN: CPT | Performed by: SURGERY

## (undated) DEVICE — ADHS LIQ MASTISOL 2/3ML

## (undated) DEVICE — UNDYED BRAIDED (POLYGLACTIN 910), SYNTHETIC ABSORBABLE SUTURE: Brand: COATED VICRYL

## (undated) DEVICE — CUFF SCD HEMOFORCE SEQ CALF STD MD

## (undated) DEVICE — DRN PENRS 3/4X18IN LTX

## (undated) DEVICE — SUT PROLN 0 MO6 30IN 8418H

## (undated) DEVICE — SPNG GZ WOVN 4X4IN 12PLY 10/BX STRL

## (undated) DEVICE — GLV SURG SENSICARE W/ALOE PF LF 8.5 STRL

## (undated) DEVICE — RICH MAJOR PROCEDURE: Brand: MEDLINE INDUSTRIES, INC.

## (undated) DEVICE — VIOLET BRAIDED (POLYGLACTIN 910), SYNTHETIC ABSORBABLE SUTURE: Brand: COATED VICRYL

## (undated) DEVICE — SUT VICRYL 3/0 CT1 27IN J258H

## (undated) DEVICE — STRIP,CLOSURE,WOUND,MEDI-STRIP,1/2X4: Brand: MEDLINE

## (undated) DEVICE — DRSNG WND GZ PAD BORDERED LF 4X5IN STRL

## (undated) DEVICE — NDL HYPO ECLPS SFTY 22G 1 1/2IN

## (undated) DEVICE — SUT SILK 2/0 SH 30IN K833H

## (undated) DEVICE — SYR LUERLOK 30CC

## (undated) DEVICE — BLD CLIP UNIV SURG GRY